# Patient Record
Sex: FEMALE | Race: WHITE | Employment: FULL TIME | URBAN - NONMETROPOLITAN AREA
[De-identification: names, ages, dates, MRNs, and addresses within clinical notes are randomized per-mention and may not be internally consistent; named-entity substitution may affect disease eponyms.]

---

## 2022-08-15 ENCOUNTER — HOSPITAL ENCOUNTER (EMERGENCY)
Age: 32
Discharge: HOME | End: 2022-08-15
Attending: EMERGENCY MEDICINE
Payer: MEDICAID

## 2022-08-15 ENCOUNTER — APPOINTMENT (OUTPATIENT)
Dept: CT IMAGING | Age: 32
End: 2022-08-15
Attending: EMERGENCY MEDICINE
Payer: MEDICAID

## 2022-08-15 VITALS
SYSTOLIC BLOOD PRESSURE: 120 MMHG | OXYGEN SATURATION: 100 % | DIASTOLIC BLOOD PRESSURE: 80 MMHG | RESPIRATION RATE: 18 BRPM | TEMPERATURE: 98 F | HEART RATE: 62 BPM
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
## 2022-08-15 PROCEDURE — 99284 EMERGENCY DEPT VISIT MOD MDM: CPT

## 2022-08-15 PROCEDURE — 70486 CT MAXILLOFACIAL W/O DYE: CPT

## 2022-08-15 RX ORDER — HYDROCODONE BITARTRATE AND ACETAMINOPHEN 5; 325 MG/1; MG/1
1 TABLET ORAL
Status: DISCONTINUED | OUTPATIENT
Start: 2022-08-15 | End: 2022-08-15 | Stop reason: HOSPADM
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
## 2022-08-15 NOTE — Clinical Note
[address]  [facility] EMERGENCY DEPT  [phone]  [phone]    Work/School Note    Date: 8/15/2022    To Whom It May concern:    Edwin Mayer was seen and treated today in the emergency room by the following provider(s):  Attending Provider: Hilario López MD.      Edwin Mayer is excused from work/school on 08/15/22 and 08/16/22. She is medically clear to return to work/school on 8/17/2022.        Sincerely,          Jaycee Adler MD
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
## 2022-08-15 NOTE — Clinical Note
[address] [facility] EMERGENCY DEPT  [phone]  [phone]    Work/School Note    Date: 8/15/2022    To Whom It May concern:    Francoise Lopez was seen and treated today in the emergency room by the following provider(s):  Attending Provider: Katharina Vee MD.      Francoise Lopez is excused from work/school on 08/15/22 and 08/16/22. She is medically clear to return to work/school on 8/17/2022.        Sincerely,          Nata Kulkarni
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
## 2022-08-15 NOTE — ED NOTES
Called [facility] at [phone] and spoke to Moshe Duval and ally of pt's complaint. Will be coming to see patient in ER.
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
